# Patient Record
Sex: FEMALE | Race: BLACK OR AFRICAN AMERICAN | Employment: FULL TIME | URBAN - METROPOLITAN AREA
[De-identification: names, ages, dates, MRNs, and addresses within clinical notes are randomized per-mention and may not be internally consistent; named-entity substitution may affect disease eponyms.]

---

## 2021-10-24 ENCOUNTER — HOSPITAL ENCOUNTER (EMERGENCY)
Age: 61
Discharge: HOME | End: 2021-10-24
Attending: EMERGENCY MEDICINE
Payer: OTHER GOVERNMENT

## 2021-10-24 ENCOUNTER — APPOINTMENT (OUTPATIENT)
Dept: GENERAL RADIOLOGY | Age: 61
End: 2021-10-24
Payer: OTHER GOVERNMENT

## 2021-10-24 VITALS
HEART RATE: 86 BPM | SYSTOLIC BLOOD PRESSURE: 138 MMHG | HEIGHT: 65 IN | OXYGEN SATURATION: 98 % | WEIGHT: 189 LBS | TEMPERATURE: 98.2 F | RESPIRATION RATE: 16 BRPM | BODY MASS INDEX: 31.49 KG/M2 | DIASTOLIC BLOOD PRESSURE: 85 MMHG

## 2021-10-24 DIAGNOSIS — R55 SYNCOPE AND COLLAPSE: Primary | ICD-10-CM

## 2021-10-24 LAB
ABSOLUTE EOS #: 0.05 K/UL (ref 0–0.44)
ABSOLUTE EOS #: NORMAL K/UL (ref 0–0.4)
ABSOLUTE IMMATURE GRANULOCYTE: 0.02 K/UL (ref 0–0.3)
ABSOLUTE IMMATURE GRANULOCYTE: NORMAL K/UL (ref 0–0.3)
ABSOLUTE LYMPH #: 1.87 K/UL (ref 1.1–3.7)
ABSOLUTE LYMPH #: NORMAL K/UL (ref 1–4.8)
ABSOLUTE MONO #: 0.75 K/UL (ref 0.1–1.2)
ABSOLUTE MONO #: NORMAL K/UL (ref 0.2–0.8)
ANION GAP SERPL CALCULATED.3IONS-SCNC: 14 MMOL/L (ref 9–17)
ANION GAP SERPL CALCULATED.3IONS-SCNC: NORMAL MMOL/L
BASOPHILS # BLD: 1 % (ref 0–2)
BASOPHILS # BLD: NORMAL % (ref 0–2)
BASOPHILS ABSOLUTE: 0.05 K/UL (ref 0–0.2)
BASOPHILS ABSOLUTE: NORMAL K/UL (ref 0–0.2)
BUN BLDV-MCNC: 13 MG/DL (ref 8–23)
BUN BLDV-MCNC: NORMAL MG/DL (ref 8–23)
BUN/CREAT BLD: 19 (ref 9–20)
BUN/CREAT BLD: NORMAL (ref 9–20)
CALCIUM SERPL-MCNC: 9.3 MG/DL (ref 8.6–10.4)
CALCIUM SERPL-MCNC: NORMAL MG/DL (ref 8.6–10.4)
CHLORIDE BLD-SCNC: 101 MMOL/L (ref 98–107)
CHLORIDE BLD-SCNC: NORMAL MMOL/L (ref 98–107)
CO2: 24 MMOL/L (ref 20–31)
CO2: NORMAL MMOL/L (ref 20–31)
CREAT SERPL-MCNC: 0.69 MG/DL (ref 0.5–0.9)
CREAT SERPL-MCNC: NORMAL MG/DL (ref 0.5–0.9)
DIFFERENTIAL TYPE: ABNORMAL
DIFFERENTIAL TYPE: NORMAL
EOSINOPHILS RELATIVE PERCENT: 1 % (ref 1–4)
EOSINOPHILS RELATIVE PERCENT: NORMAL % (ref 1–4)
GFR AFRICAN AMERICAN: >60 ML/MIN
GFR AFRICAN AMERICAN: NORMAL ML/MIN
GFR NON-AFRICAN AMERICAN: >60 ML/MIN
GFR NON-AFRICAN AMERICAN: NORMAL ML/MIN
GFR SERPL CREATININE-BSD FRML MDRD: ABNORMAL ML/MIN/{1.73_M2}
GFR SERPL CREATININE-BSD FRML MDRD: ABNORMAL ML/MIN/{1.73_M2}
GFR SERPL CREATININE-BSD FRML MDRD: NORMAL ML/MIN/{1.73_M2}
GFR SERPL CREATININE-BSD FRML MDRD: NORMAL ML/MIN/{1.73_M2}
GLUCOSE BLD-MCNC: 156 MG/DL (ref 70–99)
GLUCOSE BLD-MCNC: NORMAL MG/DL (ref 70–99)
HCT VFR BLD CALC: 43.6 % (ref 36.3–47.1)
HCT VFR BLD CALC: NORMAL % (ref 36–46)
HEMOGLOBIN: 14.3 G/DL (ref 11.9–15.1)
HEMOGLOBIN: NORMAL G/DL (ref 12–16)
IMMATURE GRANULOCYTES: 0 %
IMMATURE GRANULOCYTES: NORMAL %
LYMPHOCYTES # BLD: 21 % (ref 24–43)
LYMPHOCYTES # BLD: NORMAL % (ref 24–44)
MCH RBC QN AUTO: 29.5 PG (ref 25.2–33.5)
MCH RBC QN AUTO: NORMAL PG (ref 26–34)
MCHC RBC AUTO-ENTMCNC: 32.8 G/DL (ref 28.4–34.8)
MCHC RBC AUTO-ENTMCNC: NORMAL G/DL (ref 31–37)
MCV RBC AUTO: 89.9 FL (ref 82.6–102.9)
MCV RBC AUTO: NORMAL FL (ref 80–100)
MONOCYTES # BLD: 9 % (ref 3–12)
MONOCYTES # BLD: NORMAL % (ref 1–7)
NRBC AUTOMATED: 0 PER 100 WBC
NRBC AUTOMATED: NORMAL PER 100 WBC
PDW BLD-RTO: 12.5 % (ref 11.8–14.4)
PDW BLD-RTO: NORMAL % (ref 11.5–14.5)
PLATELET # BLD: 237 K/UL (ref 138–453)
PLATELET # BLD: NORMAL K/UL (ref 130–400)
PLATELET ESTIMATE: ABNORMAL
PLATELET ESTIMATE: NORMAL
PMV BLD AUTO: 11.3 FL (ref 8.1–13.5)
PMV BLD AUTO: NORMAL FL (ref 6–12)
POTASSIUM SERPL-SCNC: 3.8 MMOL/L (ref 3.7–5.3)
POTASSIUM SERPL-SCNC: NORMAL MMOL/L (ref 3.7–5.3)
RBC # BLD: 4.85 M/UL (ref 3.95–5.11)
RBC # BLD: ABNORMAL 10*6/UL
RBC # BLD: NORMAL 10*6/UL
RBC # BLD: NORMAL M/UL (ref 4–5.2)
SEG NEUTROPHILS: 68 % (ref 36–65)
SEG NEUTROPHILS: NORMAL % (ref 36–66)
SEGMENTED NEUTROPHILS ABSOLUTE COUNT: 6.05 K/UL (ref 1.5–8.1)
SEGMENTED NEUTROPHILS ABSOLUTE COUNT: NORMAL K/UL (ref 1.8–7.7)
SODIUM BLD-SCNC: 139 MMOL/L (ref 135–144)
SODIUM BLD-SCNC: NORMAL MMOL/L (ref 135–144)
TROPONIN INTERP: NORMAL
TROPONIN INTERP: NORMAL
TROPONIN T: NORMAL NG/ML
TROPONIN T: NORMAL NG/ML
TROPONIN, HIGH SENSITIVITY: <6 NG/L (ref 0–14)
TROPONIN, HIGH SENSITIVITY: NORMAL NG/L (ref 0–14)
WBC # BLD: 8.8 K/UL (ref 3.5–11.3)
WBC # BLD: ABNORMAL 10*3/UL
WBC # BLD: NORMAL 10*3/UL
WBC # BLD: NORMAL K/UL (ref 3.5–11)

## 2021-10-24 PROCEDURE — 36415 COLL VENOUS BLD VENIPUNCTURE: CPT

## 2021-10-24 PROCEDURE — 85025 COMPLETE CBC W/AUTO DIFF WBC: CPT

## 2021-10-24 PROCEDURE — 2580000003 HC RX 258: Performed by: EMERGENCY MEDICINE

## 2021-10-24 PROCEDURE — 93005 ELECTROCARDIOGRAM TRACING: CPT | Performed by: EMERGENCY MEDICINE

## 2021-10-24 PROCEDURE — 99284 EMERGENCY DEPT VISIT MOD MDM: CPT

## 2021-10-24 PROCEDURE — 71045 X-RAY EXAM CHEST 1 VIEW: CPT

## 2021-10-24 PROCEDURE — 84484 ASSAY OF TROPONIN QUANT: CPT

## 2021-10-24 PROCEDURE — 80048 BASIC METABOLIC PNL TOTAL CA: CPT

## 2021-10-24 RX ORDER — 0.9 % SODIUM CHLORIDE 0.9 %
500 INTRAVENOUS SOLUTION INTRAVENOUS ONCE
Status: COMPLETED | OUTPATIENT
Start: 2021-10-24 | End: 2021-10-24

## 2021-10-24 RX ORDER — LISINOPRIL 5 MG/1
5 TABLET ORAL DAILY
COMMUNITY

## 2021-10-24 RX ORDER — FAMOTIDINE 10 MG
10 TABLET ORAL ONCE
COMMUNITY

## 2021-10-24 RX ORDER — HYDROCHLOROTHIAZIDE 25 MG/1
25 TABLET ORAL DAILY
COMMUNITY

## 2021-10-24 RX ORDER — CETIRIZINE HYDROCHLORIDE 10 MG/1
10 TABLET ORAL DAILY
COMMUNITY

## 2021-10-24 RX ADMIN — SODIUM CHLORIDE 500 ML: 9 INJECTION, SOLUTION INTRAVENOUS at 22:22

## 2021-10-24 ASSESSMENT — ENCOUNTER SYMPTOMS
COLOR CHANGE: 0
CHEST TIGHTNESS: 0
SHORTNESS OF BREATH: 0
EYES NEGATIVE: 1
ABDOMINAL DISTENTION: 0
SINUS PAIN: 0
APNEA: 0
CONSTIPATION: 0
VOMITING: 0
DIARRHEA: 0

## 2021-10-25 LAB
EKG ATRIAL RATE: 79 BPM
EKG P AXIS: 45 DEGREES
EKG P-R INTERVAL: 140 MS
EKG Q-T INTERVAL: 376 MS
EKG QRS DURATION: 82 MS
EKG QTC CALCULATION (BAZETT): 431 MS
EKG R AXIS: 32 DEGREES
EKG T AXIS: 12 DEGREES
EKG VENTRICULAR RATE: 79 BPM

## 2021-10-25 PROCEDURE — 93010 ELECTROCARDIOGRAM REPORT: CPT | Performed by: INTERNAL MEDICINE

## 2021-10-25 NOTE — ED PROVIDER NOTES
EMERGENCY DEPARTMENT ENCOUNTER    Pt Name: Eric Nava  MRN: [id number]  [place]to 1960  Date of evaluation: 10/24/21  CHIEF COMPLAINT       Chief Complaint   Patient presents with    Loss of Consciousness     1-2 min per family, seen by ems bp elevated     HISTORY OF PRESENT ILLNESS   51-year-old female presents emergency room for loss of consciousness. Patient was at dinner and started to feel nauseous. She felt ill and lightheaded and laid her head down. She states that she did lose consciousness for a moment or 2. She did not have any chest pain or difficulty breathing. Patient has history of high blood pressure no other major health problems. She states that EMS was called and they checked her vitals. Her blood pressure was slightly high when she came to but states it is better now. She now feels well and does not have any symptoms. REVIEW OF SYSTEMS     Review of Systems   Constitutional: Negative for activity change, chills and diaphoresis. HENT: Negative for congestion, sinus pain and tinnitus. Eyes: Negative. Respiratory: Negative for apnea, chest tightness and shortness of breath. Gastrointestinal: Negative for abdominal distention, constipation, diarrhea and vomiting. Genitourinary: Negative for difficulty urinating and frequency. Musculoskeletal: Negative for arthralgias and myalgias. Skin: Negative for color change and rash. Neurological: Negative for dizziness. Hematological: Negative. Psychiatric/Behavioral: Negative. PASTMEDICAL HISTORY   No past medical history on file. Past Problem List  There is no problem list on file for this patient. SURGICAL HISTORY     No past surgical history on file.   CURRENT MEDICATIONS       Discharge Medication List as of 10/24/2021 11:08 PM      CONTINUE these medications which have NOT CHANGED    Details   hydroCHLOROthiazide (HYDRODIURIL) 25 MG tablet Take 25 mg by mouth dailyHistorical Med      lisinopril 1 minute. We discussed admission versus discharge and patient is comfortable with discharge at this time. She is encouraged to return the emergency room for any new or worsening issues. Patient encouraged to follow-up with her primary care physician as well as cardiology. Differential considered: PE, ACS. Arrhythmia, dissection, sepsis, dehydration    ED Course as of Oct 30 0851   [facility] Oct 24, 2021   2143 There was some delay in patient care; labs were under the wrong patient    [EG]      ED Course User Index  [EG] Sabra He MD       CRITICAL CARE:       PROCEDURES:    Procedures    DIAGNOSTIC RESULTS   EKG:All EKG's are interpreted by the Emergency Department Physician who either signs or Co-signs this chart in the absence of a cardiologist.    EKG-sinus rhythm rate 79  T wave inversion lead III, aVF, V2, V3 V4 V5 nonspecific    QTc 431    RADIOLOGY:All plain film, CT, MRI, and formal ultrasound images (except ED bedside ultrasound) are read by the radiologist, see reports below, unless otherwisenoted in MDM or here. XR CHEST PORTABLE   Final Result   Marginal inspiration, without evidence of acute cardiopulmonary disease           LABS: All lab results were reviewed by myself, and all abnormals are listed below.   Labs Reviewed   BASIC METABOLIC PANEL W/ REFLEX TO MG FOR LOW K - Abnormal; Notable for the following components:       Result Value    Glucose 156 (*)     All other components within normal limits   CBC WITH AUTO DIFFERENTIAL - Abnormal; Notable for the following components:    Seg Neutrophils 68 (*)     Lymphocytes 21 (*)     All other components within normal limits   CBC WITH AUTO DIFFERENTIAL   BASIC METABOLIC PANEL W/ REFLEX TO MG FOR LOW K   TROPONIN   TROPONIN       EMERGENCY DEPARTMENTCOURSE:         Vitals:    Vitals:    10/24/21 1910 10/24/21 1911   BP: 138/85    Pulse: 86    Resp: 16    Temp:  98.2 °F (36.8 °C)   SpO2: 98%    Weight: 189 lb (85.7 kg)    Height: 5' 4.5\" (1.638 m)        The patient was given the following medications while in the emergency department:  Orders Placed This Encounter   Medications    0.9 % sodium chloride bolus     CONSULTS:  None    FINAL IMPRESSION      1. Syncope and collapse          DISPOSITION/PLAN   DISPOSITION Decision To Discharge 10/24/2021 11:08:10 PM      PATIENT REFERRED TO:  No follow-up provider specified. DISCHARGE MEDICATIONS:  Discharge Medication List as of 10/24/2021 11:08 PM        Daksha Lizama MD  Attending Emergency Physician      Care during this encounter was due to an unprecedented national emergency due to COVID-19.            Cristina Lyon MD  10/30/21 [id number]